# Patient Record
Sex: MALE | Race: OTHER
[De-identification: names, ages, dates, MRNs, and addresses within clinical notes are randomized per-mention and may not be internally consistent; named-entity substitution may affect disease eponyms.]

---

## 2019-01-08 ENCOUNTER — HOSPITAL ENCOUNTER (EMERGENCY)
Dept: HOSPITAL 62 - ER | Age: 54
Discharge: HOME | End: 2019-01-08
Payer: COMMERCIAL

## 2019-01-08 VITALS — SYSTOLIC BLOOD PRESSURE: 139 MMHG | DIASTOLIC BLOOD PRESSURE: 84 MMHG

## 2019-01-08 DIAGNOSIS — F17.200: ICD-10-CM
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
## 2019-01-08 DIAGNOSIS — S62.637B: Primary | ICD-10-CM

## 2019-01-08 PROCEDURE — 12001 RPR S/N/AX/GEN/TRNK 2.5CM/<: CPT

## 2019-01-08 PROCEDURE — 99283 EMERGENCY DEPT VISIT LOW MDM: CPT

## 2019-01-08 PROCEDURE — 90715 TDAP VACCINE 7 YRS/> IM: CPT

## 2019-01-08 PROCEDURE — 73140 X-RAY EXAM OF FINGER(S): CPT

## 2019-01-08 PROCEDURE — 90471 IMMUNIZATION ADMIN: CPT

## 2019-01-08 PROCEDURE — 0HQGXZZ REPAIR LEFT HAND SKIN, EXTERNAL APPROACH: ICD-10-PCS | Performed by: EMERGENCY MEDICINE

## 2019-01-08 NOTE — ER DOCUMENT REPORT
ED Medical Screen (RME)





- General


Chief Complaint: Hand Injury


Stated Complaint: HAND INJURY


Time Seen by Provider: 01/08/19 10:02


Notes: 





RAPID MEDICAL EVALUATION DISCLOSURE


I have seen this patient as part of a Rapid Medical Evaluation and, if 

applicable, placed any initially appropriate orders. The patient will be seen 

and fully evaluated, including a full history and physical exam, by a provider 

(in Main ED or Fast Track) when a room becomes available.





------------------------------------------------------------------





53-year-old male here with complaints of left pinky finger pain after he 

accidentally hit his finger with a hammer approximately 4 hours ago.  He had 

some bleeding that was controlled with pressure application at the urgent care 

facility.  The urgent care facility told him to come here for further 

evaluation.  He does not remember when he received his last tetanus booster.





EXAM


Left fifth finger with traumatic laceration


No active bleeding











- Related Data


Allergies/Adverse Reactions: 


                                        





No Known Allergies Allergy (Unverified 01/08/19 09:50)


   











Physical Exam





- Vital signs


Vitals: 





                                        











Temp Pulse Resp BP Pulse Ox


 


 98.2 F   73   14   157/96 H  98 


 


 01/08/19 09:58  01/08/19 09:58  01/08/19 09:58  01/08/19 09:58  01/08/19 09:58














Course





- Vital Signs


Vital signs: 





                                        











Temp Pulse Resp BP Pulse Ox


 


 98.2 F   73   14   157/96 H  98 


 


 01/08/19 09:58  01/08/19 09:58  01/08/19 09:58  01/08/19 09:58  01/08/19 09:58

## 2019-01-08 NOTE — RADIOLOGY REPORT (SQ)
EXAM DESCRIPTION:  FINGER LEFT



COMPLETED DATE/TIME:  1/8/2019 10:26 am



REASON FOR STUDY:  trauma to pinky finger



COMPARISON:  None.



NUMBER OF VIEWS:  Three views.



TECHNIQUE:  AP, lateral, and oblique images acquired of the left fifth finger.



LIMITATIONS:  None.



FINDINGS:  MINERALIZATION: Normal.

BONES: Displaced comminuted fracture of the tuft.

SOFT TISSUES: No soft tissue swelling.  No foreign body.

OTHER: No other significant finding.



IMPRESSION:  DISPLACED COMMINUTED FRACTURE OF THE TUFT OF THE LEFT FINGER.



TECHNICAL DOCUMENTATION:  JOB ID:  [id number]

 2011 [email or web address]- All Rights Reserved



Reading location - IP/workstation name: [email or web address]

## 2019-01-08 NOTE — ER DOCUMENT REPORT
ED General





- General


Chief Complaint: Hand Injury


Stated Complaint: HAND INJURY


Time Seen by Provider: 01/08/19 10:02





- HPI


Notes: 





Patient is a 53-year-old male with no significant past medical history who 

presents to the emergency department complaining of left finger injury and 

laceration from incident at work 4 hours ago.  Patient states that he had a 

hammer injury.  Patient is accompanied by his coworker who speaks English very 

well and states that he does not need a .  I did review with patient 

that if he desires a translation service that we will be more than happy to 

provide one if they do not understand anything to let me know.  Patient states 

that the pain does not radiate.  They have had the bleeding controlled since 

they were evaluated at urgent care prior to arrival.  Patient is unsure of his 

last tetanus.  No other concerns or complaints.  Denies any headache, fever, 

URI, sore throat, chest pain, palpitations, syncope, cough, shortness of breath,

wheeze, dyspnea, abdominal pain, nausea/vomiting/diarrhea, urinary retention, 

dysuria, hematuria, or rash.





- Related Data


Allergies/Adverse Reactions: 


                                        





No Known Allergies Allergy (Verified 01/08/19 11:14)


   











Past Medical History





- Social History


Smoking Status: Current Every Day Smoker


Chew tobacco use (# tins/day): No


Frequency of alcohol use: Occasional


Drug Abuse: None


Family History: Reviewed & Not Pertinent


Patient has suicidal ideation: No


Patient has homicidal ideation: No


Renal/ Medical History: Denies: Hx Peritoneal Dialysis





Review of Systems





- Review of Systems


-: Yes All other systems reviewed and negative





Physical Exam





- Vital signs


Vitals: 


                                        











Temp Pulse Resp BP Pulse Ox


 


 98.2 F   73   14   157/96 H  98 


 


 01/08/19 09:58  01/08/19 09:58  01/08/19 09:58  01/08/19 09:58  01/08/19 09:58














- Notes


Notes: 





PHYSICAL EXAMINATION:





GENERAL: Well-appearing, well-nourished and in no acute distress.





LUNGS: Breath sounds clear to auscultation bilaterally and equal.  No wheezes 

rales or rhonchi.





HEART: Regular rate and rhythm without murmurs, rubs, gallops.





Musculoskeletal: Left 5th digit:  LROM to passive/active at the DIP. Strength 

4+/5. N/V intact.  + irregular superficial 1.3cm laceration noted to the 

anterolateral surface distally.  + tenderness to the tuft.





Extremities:  No cyanosis, clubbing, or edema b/l.  Peripheral pulses 2+.  

Capillary refill less than 3 seconds.





NEUROLOGICAL:  Normal speech, normal gait.  Normal sensory, motor exams 





PSYCH: Normal mood, normal affect.





SKIN: See above.





Course





- Re-evaluation


Re-evalutation: 





01/08/19 11:50


Patient is an afebrile, well-hydrated, 53-year-old male who presents to the ED 

with an open fracture to the left distal finger with associated comminuted 

displaced tuft fracture.  Vitals are currently acceptable without any 

significant tachycardia, tachypnea, or hypoxia.  The PE is otherwise 

unremarkable for any significant neurovascular compromise, obvious 

tendon/ligament rupture, septic joint.  See XR result.  Wound was thoroughly 

irrigated and cleansed.  Wound edges were approximated as appropriately as 

possible to the swelling with 2 simple interrupted/1 horizontal mattress suture.

 Wound dressing was placed and wound instructions reviewed.  Splint applied.  

Patient was given a tetanus update today as well.  Patient is nontoxic-

appearing.  No other labs or imaging warranted at this time based on H&P.  I 

will send him home with a prescription for Keflex.  Conservative measures 

otherwise for symptoms.  Recheck with your PCM in 3-5 days.  Call orthopedics 

tomorrow to schedule an appointment for further evaluation and management.  

Return to the ED with any worsening/concerning symptoms otherwise as reviewed in

discharge.  Patient is in agreement.





- Vital Signs


Vital signs: 


                                        











Temp Pulse Resp BP Pulse Ox


 


 98.2 F   73   14   157/96 H  98 


 


 01/08/19 09:58  01/08/19 09:58  01/08/19 09:58  01/08/19 09:58  01/08/19 09:58














Procedures





- Laceration/Wound Repair


  ** Left Finger 5th digit


Time completed: 11:45


Wound length (cm): 1.3


Wound's Depth, Shape: Superficial, Irregular


Laceration pre-procedure: Sterile PPE donned, Sterile drapes applied, Other - 

chlorhexadine/saline


Anesthetic type: 1% Lidocaine


Volume Anesthetic (mLs): 8 - digital block


Wound explored: Clean, No foreign body removed


Irrigated w/ Saline (mLs): 120


Wound Debrided: Minimal


Wound Repaired With: Sutures


Suture Size/Type: 4:0, Nylon


Number of Sutures: 3 - 2 simple and 1 horizon sea.


Layer Closure?: No


Post-procedure wound care: Sterile dressing applied, Splint applied


Post-procedure NV exam normal: Yes


Complications: No





Discharge





- Discharge


Clinical Impression: 


Open fracture of finger of left hand


Qualifiers:


 Encounter type: initial encounter Finger: little finger Phalanx: distal 

Fracture alignment: displaced Qualified Code(s): S62.637B - Displaced fracture 

of distal phalanx of left little finger, initial encounter for open fracture





Condition: Stable


Disposition: HOME, SELF-CARE


Instructions:  Open Finger Tuft Fracture ([facility])


Additional Instructions: 


Rest, Ice, Compression, Elevation


Use splint as directed


Wound dressing changes as directed


Take antibiotic as directed


Tylenol/ibuprofen as needed


F/u with your PCP in 3-5 days for a recheck


Call orthopedics tomorrow to schedule an appointment for further evaluation and 

management





Return to the ED with any worsening symptoms and/or development of fever, 

headache, chest pain, palpitations, syncope, shortness of breath, trouble 

breathing, abdominal pain, n/v/d, muscle weakness/paralysis, numbness/tingling, 

swelling, redness, or other worsening symptoms that are concerning to you.


Prescriptions: 


Cephalexin Monohydrate [Keflex 500 mg Capsule] 500 mg PO TID #30 capsule


Hydrocodone/Acetaminophen [Norco 5-325 mg Tablet] 1 tab PO TID #10 tablet


Forms:  Elevated Blood Pressure, Smoking Cessation Education


Referrals: 


[facility] [Provider Group] - 01/10/19